# Patient Record
Sex: FEMALE | Race: WHITE
[De-identification: names, ages, dates, MRNs, and addresses within clinical notes are randomized per-mention and may not be internally consistent; named-entity substitution may affect disease eponyms.]

---

## 2018-03-08 ENCOUNTER — HOSPITAL ENCOUNTER (OUTPATIENT)
Dept: HOSPITAL 45 - C.RDSM | Age: 63
Discharge: HOME | End: 2018-03-08
Attending: PHYSICIAN ASSISTANT
Payer: COMMERCIAL

## 2018-03-08 DIAGNOSIS — M25.562: Primary | ICD-10-CM

## 2018-03-08 DIAGNOSIS — Z96.652: ICD-10-CM

## 2018-03-08 NOTE — DIAGNOSTIC IMAGING REPORT
L KNEE 3 VIEWS



CLINICAL HISTORY: LEFT KNEE PAIN S/P TKA pain. Dyspnea.



COMPARISON: 7/20/2016



DISCUSSION: Mild atelectatic change medial joint compartment right knee

unchanged in the prior study.



Total left knee arthroplasty good contact with underlying bone. No acute bony

abnormality There is no evidence for soft tissue swelling.



IMPRESSION: Mild degenerative change right knee unaltered from the prior study.

Left knee total arthroplasty in good position.











The above report was generated using voice recognition software.  It may contain

grammatical, syntax or spelling errors.







Electronically signed by:  Tod Brady M.D.

3/8/2018 8:28 AM



Dictated Date/Time:  3/8/2018 8:27 AM